# Patient Record
Sex: MALE | URBAN - METROPOLITAN AREA
[De-identification: names, ages, dates, MRNs, and addresses within clinical notes are randomized per-mention and may not be internally consistent; named-entity substitution may affect disease eponyms.]

---

## 2017-02-18 ENCOUNTER — TELEPHONE (OUTPATIENT)
Dept: NURSING | Facility: CLINIC | Age: 47
End: 2017-02-18

## 2024-09-09 NOTE — TELEPHONE ENCOUNTER
"Call Type: Triage Call    Presenting Problem: Patient gives permission to speak with his wife,  Bhavna Byrnes. Bhavna states patient had a normal morning and  was feeling well. At 2 pm today, patient began feeling shaky and ate  some chips. Patient began feeling sweaty and stated that \"everything  felt like it was closing in\". Then patient fell asleep for an hour.  when awaking, he felt very tired and went back to bed.  Patient is  now reporting he has heartburn and is either chilling, or feeling  shaky again. Patient sounds weak when he spoke with me to give  permission to speak with his wife. Patient is triaged per [facility]  protocol and advised to go to the ER. Patient tells wife that he  does not want to go to ER, but wife states she will take him in to  the [place] ER.  Triage Note:  Guideline Title: Weakness or Paralysis  Recommended Disposition: See ED Immediately  Original Inclination: Wanted to speak with a nurse  Override Disposition:  Intended Action: Go to Hospital / ED  Physician Contacted: No  New onset of severe generalized weakness developing over a few days or less ?  YES  Severe breathing problems ? NO  New or worsening signs and symptoms that may indicate shock ? NO  Choking sensation, cannot swallow own saliva with associated drooling and soft  muffled voice ? NO  Generalized weakness or paralysis after snake bite ? NO  Numbness in the groin and saddle area of pelvis AND lower extremity weakness OR  change in bowel or bladder control (loss of control, retention, overflow) ? NO  Chest discomfort associated with shortness of breath, sweating, odd heartbeats or  different heart rate, nausea, vomiting, lightheadedness, or fainting lasting 5 or  more minutes now or within the last hour ? NO  Chest pain spreading to the shoulders, neck, jaw, in one or both arms, stomach or  back lasting 5 or more minutes now or within the last hour. Pain is NOT  associated with taking a deep breath or a " productive cough, movement, or touch to  a localized area. ? NO  New muscle weakness after exposure to hot environment ? NO  New weakness AND new onset irregular or rapid more than 120 beats/minute heartbeat  ? NO  Pressure, fullness, squeezing sensation or pain anywhere in the chest lasting 5 or  more minutes now or within the last hour. Pain is NOT associated with taking a  deep breath or a productive cough, movement, or touch to a localized area on the  chest. ? NO  New paralysis (unable to move) or weakness (not due to pain) involving both sides  of body below a specific level ? NO  New numbness, weakness or paralysis involving face, arm or leg, especially on same  side of body, loss of balance or coordination, confusion or trouble speaking  occurring now or within last 8 hours ? NO  Experienced transient ischemic symptoms occurring more than 8 hours ago, but  within 24 hours, now resolved ? NO  New onset or sudden worsening of altered mental status now or within last 8 hours  ? NO  New or sudden worsening difficulty speaking or swallowing occurring now or within  last 8 hours ? NO  New loss of balance or coordination (purposeful action) causing sudden trouble  walking or sitting upright occurring now or within last 8 hours ? NO  Physician Instructions:  Care Advice: Another adult should drive.  IMMEDIATE ACTION  Call  if having chest pain lasting 5 minutes or more, sudden severe  shortness of breath, loss of consciousness, or signs of shock (such as  unable to stand due to faintness, dizziness, or lightheadedness  new onset of confusion  slow to respond or difficult to awaken  skin is pale, gray, cool, or moist to touch  severe weakness).   09-Sep-2024 17:28

## 2025-07-04 ENCOUNTER — HOSPITAL ENCOUNTER (EMERGENCY)
Age: 55
Discharge: HOME | End: 2025-07-04
Payer: COMMERCIAL

## 2025-07-04 VITALS
HEART RATE: 88 BPM | OXYGEN SATURATION: 95 % | RESPIRATION RATE: 18 BRPM | TEMPERATURE: 97.1 F | SYSTOLIC BLOOD PRESSURE: 129 MMHG | DIASTOLIC BLOOD PRESSURE: 86 MMHG

## 2025-07-04 VITALS — BODY MASS INDEX: 42.2 KG/M2

## 2025-07-04 VITALS
RESPIRATION RATE: 16 BRPM | DIASTOLIC BLOOD PRESSURE: 78 MMHG | OXYGEN SATURATION: 96 % | TEMPERATURE: 97.34 F | HEART RATE: 103 BPM | SYSTOLIC BLOOD PRESSURE: 128 MMHG

## 2025-07-04 DIAGNOSIS — R00.0: ICD-10-CM

## 2025-07-04 DIAGNOSIS — R10.11: ICD-10-CM

## 2025-07-04 DIAGNOSIS — R11.0: ICD-10-CM

## 2025-07-04 DIAGNOSIS — K63.89: Primary | ICD-10-CM

## 2025-07-04 DIAGNOSIS — G47.30: ICD-10-CM

## 2025-07-04 DIAGNOSIS — I10: ICD-10-CM

## 2025-07-04 DIAGNOSIS — E78.5: ICD-10-CM

## 2025-07-04 LAB
ALP SERPL-CCNC: 60 U/L (ref 40–150)
AMM URATE CRY #/AREA URNS HPF: (no result) /[HPF]
AMORPH SED URNS QL MICRO: (no result)
ANION GAP: 4 MEQ/L (ref 7–15)
APPEARANCE URINE: CLEAR
BASOPHILS ABSOLUTE AUTO: 0.06 K/UL (ref 0–0.3)
BASOPHILS PERCENT AUTO: 0.6 % (ref 0–3)
BILIRUB DIRECT SERPL-MCNC: 0.1 MG/DL (ref 0–0.5)
BILIRUBIN URINE: NEGATIVE
BLOOD UR QL: (no result)
CA CARBONATE CRY #/AREA URNS HPF: (no result) /[HPF]
CA PHOS CRY #/AREA URNS HPF: (no result) /[HPF]
CHOLESTEROL CRYSTALS [#/AREA] IN URINE SEDIMENT BY MICROSCOPY HIGH POWER FIELD: (no result) /[HPF]
COLOR URINE: YELLOW
CREAT CL PREDICTED SERPL C-G-VRATE: 63.54 ML/MIN
EOSINOPHILS ABSOLUTE AUTO: 0.25 K/UL (ref 0–0.5)
EOSINOPHILS PERCENT AUTO: 2.7 % (ref 0–7)
ESTIMATED GLOMERULAR FILT RATE: 79 ML/MIN
GLUCOSE URINE: NEGATIVE
HEMATOCRIT: 47.2 % (ref 37–53)
IMMATURE GRANULOCYTES ABS AUTO: 0.04 K/UL (ref 0–0.3)
IMMATURE GRANULOCYTES PCT AUTO: 0.4 %
KETONES URINE: NEGATIVE
LEUKOCYTE ESTERASE URINE: NEGATIVE
LIPASE*: 47 U/L (ref 23–300)
LYMPHOCYTES  ABSOLUTE AUTO: 1.92 K/UL (ref 0.9–2.9)
LYMPHOCYTES PERCENT AUTO: 20.4 % (ref 20–44)
Lab: (no result)
Lab: 0.6 MG/DL (ref 0.1–1.5)
Lab: 1.1 MG/DL (ref 0.5–1.5)
Lab: 11 MG/DL (ref 7–30)
Lab: 12.4 % (ref 11.5–15.5)
Lab: 137 MMOL/L (ref 135–149)
Lab: 15.6 GM/DL (ref 13.5–17.5)
Lab: 2.3 MG/DL (ref 0.5–1)
Lab: 34 MMOL/L (ref 20–32)
Lab: 37 U/L (ref 12–35)
Lab: 4.5 G/DL (ref 3.3–5)
Lab: 4.6 MMOL/L (ref 3.6–5.1)
Lab: 53 U/L (ref 4–50)
Lab: 9.41 K/UL (ref 4.5–11)
Lab: 9.8 MG/DL (ref 8.4–10.6)
Lab: 90 MG/DL (ref 60–115)
Lab: 99 MMOL/L (ref 96–114)
Lab: NO
MEAN CORPUSCULAR HEMOGLOBIN: 28 PG (ref 26–34)
MEAN CORPUSCULAR HGB CONC: 33 GM/DL (ref 32–36)
MEAN CORPUSCULAR VOLUME: 85 FL (ref 80–100)
MONOCYTES ABSOLUTE AUTO: 0.8 K/UL (ref 0–0.9)
MONOCYTES PERCENT AUTO: 8.2 % (ref 0–11)
MUCUS [PRESENCE] IN URINE SEDIMENT: (no result)
NEUTROPHILS ABSOLUTE AUTO: 6.37 K/UL (ref 1.7–7)
NEUTROPHILS PERCENT AUTO: 67.7 % (ref 42–72)
NITRITE URINE: NEGATIVE
OTHER ELEMENTS URNS MICRO: (no result)
PH UR: 6 [PH] (ref 5–8.5)
PLATELET COUNT*: 393 K/UL (ref 140–440)
PROT SERPL-MCNC: 7.6 G/DL (ref 6–8.3)
PROTEIN URINE: NEGATIVE
RBC CASTS #/AREA URNS: (no result) /[LPF]
RBC URINE: (no result) (ref 0–2)
RED BLOOD COUNT: 5.55 M/UL (ref 4.3–5.9)
SP GR UR: 1.02 (ref 1–1.03)
TRI-PHOS CRY #/AREA URNS LPF: (no result) /[LPF]
URATE CRY #/AREA URNS HPF: (no result) /[HPF]
UROBILINOGEN URINE: 1 (ref 0.2–1)
WBC URINE: (no result) (ref 0–5)
YEAST URNS QL MICRO: (no result)

## 2025-07-04 PROCEDURE — 80076 HEPATIC FUNCTION PANEL: CPT

## 2025-07-04 PROCEDURE — 99285 EMERGENCY DEPT VISIT HI MDM: CPT

## 2025-07-04 PROCEDURE — 83690 ASSAY OF LIPASE: CPT

## 2025-07-04 PROCEDURE — 74177 CT ABD & PELVIS W/CONTRAST: CPT

## 2025-07-04 PROCEDURE — 36415 COLL VENOUS BLD VENIPUNCTURE: CPT

## 2025-07-04 PROCEDURE — 86140 C-REACTIVE PROTEIN: CPT

## 2025-07-04 PROCEDURE — 81001 URINALYSIS AUTO W/SCOPE: CPT

## 2025-07-04 PROCEDURE — 76705 ECHO EXAM OF ABDOMEN: CPT

## 2025-07-04 PROCEDURE — 99284 EMERGENCY DEPT VISIT MOD MDM: CPT

## 2025-07-04 PROCEDURE — 80048 BASIC METABOLIC PNL TOTAL CA: CPT

## 2025-07-04 PROCEDURE — 85025 COMPLETE CBC W/AUTO DIFF WBC: CPT
